# Patient Record
Sex: FEMALE | Race: AMERICAN INDIAN OR ALASKA NATIVE
[De-identification: names, ages, dates, MRNs, and addresses within clinical notes are randomized per-mention and may not be internally consistent; named-entity substitution may affect disease eponyms.]

---

## 2017-04-23 NOTE — EMERGENCY DEPARTMENT REPORT
HPI





- General


Chief Complaint: Chest Pain


Time Seen by Provider: 17 19:32





- HPI


HPI: 





The patient is a 36-year-old female presents for evaluation of chest pain.  The 

patient reports chest pain for the past 2 days, exudates in severity, sharp in 

quality, right-sided in location, exacerbated with movement of the chest wall.  

The patient denies fever, cough, dyspnea, syncope, hemoptysis, unilateral leg 

swelling, oral contraceptive use, recent immobilization, history of DVT or PE, 

recent cancer.











ED Past Medical Hx





- Past Medical History


Previous Medical History?: Yes


Hx Headaches / Migraines: Yes


Hx Psychiatric Treatment: Yes (Depression)


Additional medical history: depression





- Surgical History


Past Surgical History?: Yes


Additional Surgical History:  x 2





- Social History


Smoking Status: Never Smoker


Substance Use Type: None





- Medications


Home Medications: 


 Home Medications











 Medication  Instructions  Recorded  Confirmed  Last Taken  Type


 


Cyclobenzaprine HCl [Flexeril 5 MG 5 mg PO Q8HR PRN #15 tab 17  Unknown Rx





TAB]     


 


Omeprazole Magnesium [PriLOSEC Otc] 20 mg PO QDAY #14 tablet. 17  

Unknown Rx














ED Review of Systems


ROS: 


Stated complaint: CHEST PAINS


Other details as noted in HPI








Constitutional: denies: fever


ENT: denies: throat or neck pain


Respiratory: denies: cough, shortness of breath


Cardiovascular: reports chest pain


Endocrine: denies unexplained weight loss or gain


Gastrointestinal: denies: abdominal pain, nausea


Genitourinary: denies: dysuria


Musculoskeletal: denies: leg swelling


Skin: denies: rash


Neurological: denies: headache


Hematological/Lymphatic: denies: easy bleeding or easy bruising  


Psych: denies sadness or hopelessness














Physical Exam





- Physical Exam


Vital Signs: 


 Vital Signs











  17





  14:39


 


Temperature 98.1 F


 


Pulse Rate 73


 


Respiratory 20





Rate 


 


Blood Pressure 102/70


 


O2 Sat by Pulse 100





Oximetry 











Physical Exam: 








General: well-nourished, well-developed, no acute distress


Head: Normocephalic, atraumatic


Eyes: normal sclera


ENT: Mucous membranes are pink and moist 


Neck: trachea midline, neck supple, No neck stiffness, no cervical adenopathy


Respiratory: Breath sounds equal bilaterally, no wheezing, rales, or rhonchi


Cardio: S1 and S2 present, no murmurs, rubs, gallops, capillary refill is brisk


Abdomen: Normoactive bowel sounds, soft abdomen, no rigidity, no guarding or 

rebound tenderness


Chest WALL/Back: No tenderness to palpation of the chest wall, no CVA 

tenderness with percussion


Musc: No pitting edema


Skin: No rash


Neuro: no facial drooping, normal speech


Psych: Normal affect











ED Course


 Vital Signs











  17





  14:39


 


Temperature 98.1 F


 


Pulse Rate 73


 


Respiratory 20





Rate 


 


Blood Pressure 102/70


 


O2 Sat by Pulse 100





Oximetry 














ED Medical Decision Making





- Lab Data


Result diagrams: 


 17 14:47





 17 14:47





- Medical Decision Making








The patient was seen and examined by myself.  The patient is placed on a 

cardiac monitor and continuous pulse ox. On initial evaluation, the patient was 

found to be in no distress.  EKG was negative for findings suggestive of acute 

cardiac infarct.  The patient is given an IM dose of morphine for her pain. 

Labs and imaging are obtained. Chest x-ray is negative for pneumothorax, focal 

consolidation, pulmonary vascular congestion, pleural effusion, or other 

obvious acute cardiopulmonary disease process.  Lab results were non-concerning 

including levels of troponin, WBC, hemoglobin, hematocrit, electrolytes, renal 

function. The patient was reevaluated and reported that their symptoms were 

markedly improved.  As the patient has a ANGELO risk score less than 2, and a well

's score less than 2 and is PERC negative, the patient is at low risk of ACS or 

pulmonary emboli etiology of their symptoms. The patient is stable for 

discharge with outpatient follow-up.  The patient is given follow-up and return 

instructions.  The patient expressed understanding and agreed with the plan.  

The patient is discharged in stable condition.


Critical care attestation.: 


If time is entered above; I have spent that time in minutes in the direct care 

of this critically ill patient, excluding procedure time.








ED Disposition


Clinical Impression: 


 Acute chest pain





Disposition: DISCHARGED TO HOME OR SELFCARE


Is pt being admited?: No


Does the pt Need Aspirin: No


Condition: Stable


Instructions:  Chest Pain (ED), Peptic Ulcer (ED), Costochondritis (ED)


Prescriptions: 


Cyclobenzaprine HCl [Flexeril 5 MG TAB] 5 mg PO Q8HR PRN #15 tab


 PRN Reason: Pain


Omeprazole Magnesium [PriLOSEC Otc] 20 mg PO QDAY #14 tablet.


Referrals: 


[facility],MD [Primary Care Provider] - 3-5 Days


Forms:  Work/School Release Form(ED)


Time of Disposition: 19:48





ANGELO score





- Angelo Score


Age > 65: (0) No


Aspirin use within the Past 7 Days: (0) No


3 or more CAD Risk Factors: (0) No


2 or more Angina events in past 24 hrs: (0) No


Known CAD with more than 50% Stenosis: (0) No


Elevated Cardiac Markers: (0) No


ST Deviation Greater than 0.5mm: (0) No


ANGELO Score: 0

## 2017-04-24 NOTE — XRAY REPORT
ROUTINE CHEST, TWO VIEWS:



HISTORY:  chest pain.



The trachea, heart, mediastinal contour, lung fields and bony thorax 

are unremarkable. Mild dextrocurvature of the thoracic spine is noted.



IMPRESSION:

Mild scoliosis. Otherwise normal chest x-ray.

## 2019-06-28 ENCOUNTER — HOSPITAL ENCOUNTER (EMERGENCY)
Dept: HOSPITAL 5 - ED | Age: 39
Discharge: LEFT BEFORE DISCHARGE | End: 2019-06-28
Payer: COMMERCIAL

## 2019-06-28 VITALS — DIASTOLIC BLOOD PRESSURE: 76 MMHG | SYSTOLIC BLOOD PRESSURE: 106 MMHG

## 2019-06-28 DIAGNOSIS — Z53.21: ICD-10-CM

## 2019-06-28 DIAGNOSIS — M54.89: Primary | ICD-10-CM

## 2019-06-28 LAB
BILIRUB UR QL STRIP: (no result)
BLOOD UR QL VISUAL: (no result)
MUCOUS THREADS #/AREA URNS HPF: (no result) /HPF
PH UR STRIP: 6 [PH] (ref 5–7)
PROT UR STRIP-MCNC: (no result) MG/DL
RBC #/AREA URNS HPF: 4 /HPF (ref 0–6)
UROBILINOGEN UR-MCNC: < 2 MG/DL (ref ?–2)
WBC #/AREA URNS HPF: 3 /HPF (ref 0–6)

## 2019-06-28 PROCEDURE — 81001 URINALYSIS AUTO W/SCOPE: CPT

## 2019-06-28 PROCEDURE — 81025 URINE PREGNANCY TEST: CPT

## 2019-06-28 PROCEDURE — 99283 EMERGENCY DEPT VISIT LOW MDM: CPT

## 2019-06-28 NOTE — EVENT NOTE
ED Screening Note


Date of service: 06/28/19


Time: 17:02


ED Screening Note: 





This is a 38 y.o. F. that presents to the ER with dysuria, urinary frequency, 

back pain, and pelvic pressure.





This initial assessment/diagnostic orders/clinical plan/treatment(s) is/are 

subject to change based on patients health status, clinical progression and re-

assessment by fellow clinical providers in the ED. Further treatment and workup 

at subsequent clinical providers discretion. Patient/guardian urged not to elope

from the ED as their condition may be serious if not clinically assessed and 

managed. 





Initial orders include: 





UA & hCG.

## 2019-06-28 NOTE — EMERGENCY DEPARTMENT REPORT
ED Female  HPI





- General


Chief complaint: Urogenital-Female


Stated complaint: BACK PAIN/FREQUENT URINATION


Time Seen by Provider: 19 17:02


Source: patient


Mode of arrival: Ambulatory


Limitations: No Limitations





- History of Present Illness


Initial comments: 





Patient is a 37 yo female who presents the emergency department with complaints 

of urinary frequency that began a few days ago.  She has associated dysuria, 

lower back pain, vaginal irritation.  She denies any vaginal discharge, lesions,

blisters.  She denies any past medical history or allergies medications.  

Patient states she thinks she has UTI.





- Related Data


                                  Previous Rx's











 Medication  Instructions  Recorded  Last Taken  Type


 


Cyclobenzaprine HCl [Flexeril 5 MG 5 mg PO Q8HR PRN #15 tab 17 Unknown Rx





TAB]    


 


Omeprazole Magnesium [PriLOSEC Otc] 20 mg PO QDAY #14 tablet. 17 Unknown

Rx











                                    Allergies











Allergy/AdvReac Type Severity Reaction Status Date / Time


 


No Known Allergies Allergy   Unverified 19 16:45














ED Review of Systems


ROS: 


Stated complaint: BACK PAIN/FREQUENT URINATION


Other details as noted in HPI





Comment: All other systems reviewed and negative





ED Past Medical Hx





- Past Medical History


Hx Headaches / Migraines: Yes


Hx Psychiatric Treatment: Yes (Depression)


Additional medical history: depression





- Surgical History


Additional Surgical History:  x 2





- Social History


Smoking Status: Never Smoker





- Medications


Home Medications: 


                                Home Medications











 Medication  Instructions  Recorded  Confirmed  Last Taken  Type


 


Cyclobenzaprine HCl [Flexeril 5 MG 5 mg PO Q8HR PRN #15 tab 17  Unknown Rx





TAB]     


 


Omeprazole Magnesium [PriLOSEC Otc] 20 mg PO QDAY #14 tablet. 17  

Unknown Rx














ED Physical Exam





- General


Limitations: No Limitations


General appearance: alert, in no apparent distress





- Head


Head exam: Present: atraumatic, normocephalic





- Eye


Eye exam: Present: normal appearance, PERRL





- Respiratory


Respiratory exam: Absent: respiratory distress





- Neurological Exam


Neurological exam: Present: alert, oriented X3





- Psychiatric


Psychiatric exam: Present: normal affect, normal mood





ED Course





                                   Vital Signs











  19





  17:01


 


Temperature 97.9 F


 


Pulse Rate 74


 


Respiratory 18





Rate 


 


Blood Pressure 106/76


 


O2 Sat by Pulse 97





Oximetry 














ED Medical Decision Making





- Medical Decision Making





Patient is a 37 yo female who presents the emergency department with complaints 

of urinary frequency that began a few days ago.  She has associated dysuria, 

lower back pain, vaginal irritation.  She denies any vaginal discharge, lesions,

blisters.  She denies any past medical history or allergies medications.  

Patient states she thinks she has UTI.  Advised patient that her urine is 

completely normal.  Discussed the patient that she would need a pelvic exam for 

further evaluation of her discomfort.  Patient states I just an antibiotic and 

want to go home.  I advised patient that we don't just give antibiotics and not 

know what is being treated.  Patient refuses pelvic exam.  I advised patient 

that leaving untreated can lead to PID and infertility. Advised patient that she

would need to sign out AGAINST MEDICAL ADVICE.   Witnessed by nurse.  Patient 

refused to sign AMA form and walked out of emergency department.  RN and myself 

signed AMA form but patient refused. 


Critical care attestation.: 


If time is entered above; I have spent that time in minutes in the direct care 

of this critically ill patient, excluding procedure time.








ED Disposition


Clinical Impression: 


 Dysuria, Urinary frequency, Vaginal irritation





Disposition: - LEFT AGAINST MED ADVICE


Is pt being admited?: No


Does the pt Need Aspirin: No


Condition: Undetermined


Referrals: 


[facility], [facility] [Primary Care Provider] - ASAP


Forms:  AMA Form


Time of Disposition: 21:41


Print Language: ENGLISH

## 2021-10-14 ENCOUNTER — HOSPITAL ENCOUNTER (EMERGENCY)
Dept: HOSPITAL 5 - ED | Age: 41
Discharge: HOME | End: 2021-10-14
Payer: SELF-PAY

## 2021-10-14 VITALS — SYSTOLIC BLOOD PRESSURE: 113 MMHG | DIASTOLIC BLOOD PRESSURE: 71 MMHG

## 2021-10-14 DIAGNOSIS — Z98.890: ICD-10-CM

## 2021-10-14 DIAGNOSIS — J32.9: Primary | ICD-10-CM

## 2021-10-14 DIAGNOSIS — Z72.89: ICD-10-CM

## 2021-10-14 DIAGNOSIS — F32.9: ICD-10-CM

## 2021-10-14 DIAGNOSIS — G43.909: ICD-10-CM

## 2021-10-14 DIAGNOSIS — Z79.899: ICD-10-CM

## 2021-10-14 DIAGNOSIS — J02.9: ICD-10-CM

## 2021-10-14 PROCEDURE — 99282 EMERGENCY DEPT VISIT SF MDM: CPT

## 2021-10-14 NOTE — EMERGENCY DEPARTMENT REPORT
Minor Respiratory





- HPI


Chief Complaint: Headache


Stated Complaint: HEADACHE/LEG PAIN/LOW ENERGY


Time Seen by Provider: 10/14/21 17:22


Duration: 3 Days


Pain Location: Other


Severity: mild


Minor Respiratory: Yes Rhinorrhea, Yes Sore Throat, Yes Able to Tolerate Fluids,

Yes Cough, No Ear Pain, No Sick Contacts, No Hemoptysis, No Chest Pain, No 

Shortness of Breath, No Fever


Other History: 40 YO WITH SEVERAL DAY HX OF HEADACHE, NAUSEA, SORE THROAT, COUGH

WORSE AT NIGHT.  NO SOB.  NO CP.  NO FEVER OR CHILLS.  OTC ALLERGY MEDS NOT 

HELPING.  AMBULATORY AND NON ILL APPEARING ON EXAM





ED Review of Systems


ROS: 


Stated complaint: HEADACHE/LEG PAIN/LOW ENERGY


Other details as noted in HPI





Comment: All other systems reviewed and negative





ED Past Medical Hx





- Past Medical History


Previous Medical History?: Yes


Hx Headaches / Migraines: Yes


Hx Psychiatric Treatment: Yes (Depression)


Additional medical history: depression





- Surgical History


Past Surgical History?: Yes


Additional Surgical History:  x 2





- Family History


Family history: no significant





- Social History


Smoking Status: Never Smoker


Substance Use Type: None





- Medications


Home Medications: 


                                Home Medications











 Medication  Instructions  Recorded  Confirmed  Last Taken  Type


 


Amoxicillin [Trimox CAP] 500 mg PO BID #20 capsule 10/14/21  Unknown Rx


 


Cetirizine HCl [ZyrTEC] 10 mg PO DAILY #30 capsule 10/14/21  Unknown Rx


 


Fluticasone [Flonase] 1 spray NS QDAY #1 bottle 10/14/21  Unknown Rx


 


predniSONE [Deltasone] 20 mg PO DAILY #5 tablet 10/14/21  Unknown Rx














Minor Respiratory Exam





- Exam


General: 


Vital signs noted. No distress. Alert and acting appropriately.





HEENT: Yes Pharyngeal Erythema, Yes Moist Mucous Membranes, Yes Rhinorrhea, Yes 

Frontal Tenderness, Yes Maxillary Tenderness, No Pharyngeal Exudates, No 

Conjuctival Injection


Ear: Neither TM Bulge, Neither TM Erythema, Neither EAC Pain, Neither EAC 

Discharge


Neck: Yes Supple, No Adenopathy


Lungs: Yes Good Air Exchange, No Wheezes, No Ronchi, No Stridor, No Cough, No 

Labored Respirations, No Retractions, No Use of Accessory Muscles, No Other 

Abnormal Lung Sounds


Heart: Yes Regular, No Murmur


Abdomen: Yes Normal Bowel Sounds, No Tenderness, No Peritoneal Signs


Skin: No Rash, No Edema


Neurologic: 


Alert and oriented, no deficits.








Musculoskeletal: 


Unremarkable.











ED Course


                                   Vital Signs











  10/14/21





  17:17


 


Temperature 98.3 F


 


Pulse Rate 73


 


Respiratory 16





Rate 


 


Blood Pressure 113/71





[Left] 


 


O2 Sat by Pulse 98





Oximetry 














ED Medical Decision Making





- Medical Decision Making





                             Vital Signs (72 hours)











  10/14/21





  17:17


 


Temperature 98.3 F


 


Pulse Rate 73


 


Respiratory 16





Rate 


 


Blood Pressure 113/71





[Left] 


 


O2 Sat by Pulse 98





Oximetry 








SIMPLE URI





DECADRON FOR COMFORT





DC HOME WITH DC PLAN OF CARE INCLUDING FOLLOW UP, MEDS, DIET AND ACTIVITY 


PT VERBALIZES UNDERSTANDING OF PLAN OF CARE











- Differential Diagnosis


URI


Critical care attestation.: 


If time is entered above; I have spent that time in minutes in the direct care 

of this critically ill patient, excluding procedure time.








ED Disposition


Clinical Impression: 


 Sinusitis, Pharyngitis





Disposition: 01 HOME / SELF CARE / HOMELESS


Is pt being admited?: No


Does the pt Need Aspirin: No


Condition: Stable


Instructions:  Sinusitis, Adult, Easy-to-Read


Additional Instructions: 


STAY WELL HYDRATED





MEDS AS ORDERED TODAY





FOLLOW UP WITH PCP FOR RECHECK NEXT WEEK


REFERRAL BELOW





MOTRIN OR TYLENOL FOR PAIN








Referrals: 


SUE STATON MD [Staff Physician] - 3-5 Days


Time of Disposition: 17:29

## 2022-08-04 ENCOUNTER — HOSPITAL ENCOUNTER (EMERGENCY)
Dept: HOSPITAL 5 - ED | Age: 42
LOS: 1 days | Discharge: LEFT BEFORE DISCHARGE | End: 2022-08-05
Payer: SELF-PAY

## 2022-08-04 VITALS — SYSTOLIC BLOOD PRESSURE: 130 MMHG | DIASTOLIC BLOOD PRESSURE: 58 MMHG

## 2022-08-04 DIAGNOSIS — Z53.21: ICD-10-CM

## 2022-08-04 DIAGNOSIS — M54.9: ICD-10-CM

## 2022-08-04 DIAGNOSIS — R10.9: Primary | ICD-10-CM

## 2022-08-04 LAB
BACTERIA #/AREA URNS HPF: (no result) /HPF
MUCOUS THREADS #/AREA URNS HPF: (no result) /HPF
PH UR STRIP: 5 [PH] (ref 5–7)
RBC #/AREA URNS HPF: 1 /HPF (ref 0–6)
UROBILINOGEN UR-MCNC: 0 MG/DL (ref ?–2)
WBC #/AREA URNS HPF: 13 /HPF (ref 0–6)

## 2022-08-04 PROCEDURE — 87086 URINE CULTURE/COLONY COUNT: CPT

## 2022-08-04 PROCEDURE — 81001 URINALYSIS AUTO W/SCOPE: CPT

## 2022-08-04 PROCEDURE — 81025 URINE PREGNANCY TEST: CPT
